# Patient Record
Sex: MALE | Race: WHITE | NOT HISPANIC OR LATINO | Employment: OTHER | URBAN - METROPOLITAN AREA
[De-identification: names, ages, dates, MRNs, and addresses within clinical notes are randomized per-mention and may not be internally consistent; named-entity substitution may affect disease eponyms.]

---

## 2017-05-08 ENCOUNTER — HOSPITAL ENCOUNTER (OUTPATIENT)
Dept: ULTRASOUND IMAGING | Facility: CLINIC | Age: 62
Discharge: HOME | End: 2017-05-08
Attending: INTERNAL MEDICINE | Admitting: INTERNAL MEDICINE
Payer: COMMERCIAL

## 2017-05-08 DIAGNOSIS — R10.11 RIGHT UPPER QUADRANT PAIN: ICD-10-CM

## 2017-05-08 PROCEDURE — 76700 US EXAM ABDOM COMPLETE: CPT

## 2024-01-18 ENCOUNTER — OFFICE VISIT (OUTPATIENT)
Dept: INTERNAL MEDICINE | Facility: CLINIC | Age: 69
End: 2024-01-18
Payer: COMMERCIAL

## 2024-01-18 VITALS
DIASTOLIC BLOOD PRESSURE: 84 MMHG | TEMPERATURE: 99 F | BODY MASS INDEX: 26.88 KG/M2 | WEIGHT: 187.8 LBS | RESPIRATION RATE: 18 BRPM | SYSTOLIC BLOOD PRESSURE: 128 MMHG | HEART RATE: 93 BPM | OXYGEN SATURATION: 96 % | HEIGHT: 70 IN

## 2024-01-18 DIAGNOSIS — H92.03 EAR DISCOMFORT, BILATERAL: Primary | ICD-10-CM

## 2024-01-18 DIAGNOSIS — M25.562 CHRONIC PAIN OF LEFT KNEE: ICD-10-CM

## 2024-01-18 DIAGNOSIS — G89.29 CHRONIC PAIN OF LEFT KNEE: ICD-10-CM

## 2024-01-18 PROCEDURE — 99203 OFFICE O/P NEW LOW 30 MIN: CPT

## 2024-01-18 RX ORDER — RESPIRATORY SYNCYTIAL VIRUS VACCINE 120MCG/0.5
0.5 KIT INTRAMUSCULAR ONCE
Qty: 1 EACH | Refills: 0 | Status: CANCELLED | OUTPATIENT
Start: 2024-01-18 | End: 2024-01-18

## 2024-01-18 RX ORDER — CIPROFLOXACIN AND DEXAMETHASONE 3; 1 MG/ML; MG/ML
4 SUSPENSION/ DROPS AURICULAR (OTIC) 2 TIMES DAILY
Qty: 7.5 ML | Refills: 0 | Status: SHIPPED | OUTPATIENT
Start: 2024-01-18

## 2024-01-18 NOTE — PROGRESS NOTES
"  Assessment & Plan     (H92.03) Ear discomfort, bilateral  (primary encounter diagnosis)  Comment:     Pt has history of itchy, burning ears bilaterally since 2020.  He was seen in march of 2022 for this same concern and was treated with Ciprodex x 7 days. He notes this resolved symptoms for 6 months and then symptoms returned and have been present for past 1.5 years He denies any pain, drainage from his ears, or recent cold infection.    Although there is no evidence of infection upon exam, I will treat with Ciprodex x 7 days and have sent ENT referral as well  Plan: ciprofloxacin-dexAMETHasone (CIPRODEX) 0.3-0.1         % otic suspension, Adult ENT  Referral            (M25.562,  G89.29) Chronic pain of left knee  Comment:   Plan: Orthopedic  Referral              BMI  Estimated body mass index is 26.95 kg/m  as calculated from the following:    Height as of this encounter: 1.778 m (5' 10\").    Weight as of this encounter: 85.2 kg (187 lb 12.8 oz).             Mikaela Viera is a 68 year old, presenting for the following health issues:  Ear Problem (He has ear infection.)      1/18/2024     9:45 AM   Additional Questions   Roomed by Janice Barrios MA   Accompanied by Himself     History of Present Illness       Reason for visit:  Ear infection    He eats 0-1 servings of fruits and vegetables daily.He consumes 1 sweetened beverage(s) daily.He exercises with enough effort to increase his heart rate 20 to 29 minutes per day.  He exercises with enough effort to increase his heart rate 4 days per week.   He is taking medications regularly.               Objective    /84 (BP Location: Left arm, Patient Position: Sitting, Cuff Size: Adult Regular)   Pulse 93   Temp 99  F (37.2  C) (Oral)   Resp 18   Ht 1.778 m (5' 10\")   Wt 85.2 kg (187 lb 12.8 oz)   SpO2 96%   BMI 26.95 kg/m    Body mass index is 26.95 kg/m .    Physical Exam  Constitutional:       General: He is not in acute " distress.     Appearance: Normal appearance. He is not ill-appearing, toxic-appearing or diaphoretic.   HENT:      Head: Normocephalic and atraumatic.      Right Ear: Tympanic membrane, ear canal and external ear normal. There is no impacted cerumen.      Left Ear: Tympanic membrane, ear canal and external ear normal. There is no impacted cerumen.   Skin:     General: Skin is warm and dry.   Neurological:      Mental Status: He is alert and oriented to person, place, and time.   Psychiatric:         Mood and Affect: Mood normal.         Behavior: Behavior normal.         Thought Content: Thought content normal.         Judgment: Judgment normal.                    Signed Electronically by: KALINA Worthington CNP

## 2024-01-19 NOTE — PROGRESS NOTES
ASSESSMENT & PLAN    Maximiliano was seen today for pain.    Diagnoses and all orders for this visit:    Patellofemoral arthritis of left knee  -     Orthopedic  Referral  -     XR Knee Standing AP [place] Bilat Lat Left; Future  -     Physical Therapy Referral; Future      This issue is chronic and Worsening.  Maximiliano presents to clinic today to discuss his chronic worsening left knee pain.  Radiographs taken in clinic today were reviewed with the patient and show moderate osteoarthritis of the patellofemoral compartment, with preservation of his medial and lateral compartments.  On examination, he localizes his pain over the patellar tendon he has tenderness to palpation on the bilateral patellar facets and over the patellar tendon.  He also has noted crepitus with flexion extension of the patella.  Overall, his history, exam findings, and imaging findings are all consistent with his patellofemoral arthritis being the main  of his symptoms.  We discussed these findings and the possible treatment options including NSAIDs, physical therapy, bracing, corticosteroid injections, and referral to the orthopedic surgery team.  We determined the following plan:  -Physical therapy referral placed today to work on quad and hip strengthening  -He can continue to use over-the-counter pain medications, ice, heat as needed  -He can trial an over-the-counter compression sleeve for use during activity  -He can follow-up in our clinic in 4 to 6 weeks if not improving or sooner if unable to tolerate PT.  At that time would consider a CSI to the left knee joint      Jared Acosta DO  [facility] [place]    -----  Chief Complaint   Patient presents with    Left Knee - Pain       SUBJECTIVE  Maximiliano Michael is a/an 68 year old male who is seen in consultation at the request of  Josette Galvez C.N.P. for evaluation of left knee pain.     The patient is seen by themselves.    Onset: 5  "years(s) ago. Reports insidious onset without acute precipitating event.  Location of Pain: left anterior inferior patella, posterior knee  Worsened by: stairs, jogging  Better with: nothing yet  Treatments tried: no treatment tried to date  Associated symptoms: no distal numbness or tingling; denies swelling or warmth, constant pain with every step - dull    Orthopedic/Surgical history: YES R knee \"pigs tendon\" - Date: 20 years  Social History/Occupation: retired      REVIEW OF SYSTEMS:  Review of systems negative unless mentioned in HPI     OBJECTIVE:  /83   Pulse 73   Ht 1.778 m (5' 10\")   Wt 83.9 kg (185 lb)   BMI 26.54 kg/m     General: healthy, alert and in no distress  Skin: no suspicious lesions or rash.  CV: distal perfusion intact   Resp: normal respiratory effort without conversational dyspnea   Psych: normal mood and affect  Gait: NORMAL  Neuro: Normal light sensory exam of LL extremity     Left Knee exam  Gait: Normal  Alignment:  [x] Normal  [] Anatomic valgus  [] Anatomic varus  Inspection: [x] Normal  [] Ecchymosis present []Other   Palpation:  Joint Tenderness: [] No Tenderness  [] MJL [] LJL [] MCL Margin [] LCL Margin [] Pes Anserine [x] Distal Quadricep  [] Other   Peripatellar Tenderness: [] None [x] Lateral pole [x] Medial pole [x] Superior pole [] Inferior pole    Patellar tendon pain: [] None [x] Present    Patellar apprehension: [x] None [] Present    Patellar motion: [] Normal [x] Abnormal  Crepitus: [] None [x] Present  Effusion: [x] None [] Trace [] 1+ [] 2+ [] 3+  Range of motion:  Flexion: 135, Extension: 0  Strength:  [x] Full in all planes, including intact extensor mechanism [] Limited as described  Neurologic: Normal sensation   Vascular: Normal pulses   Special tests:   Lachman: Negative  Anterior Drawer: Negative  Sag/quad Activation: NP  Posterior Drawer: Negative  Valgus Stress: Negative at 0/30  Varus Stress: Negative at 0/30  Eduarda's: Negative  Thessaly: NP "     Other notable findings/comments: None     RADIOLOGY:  Final results and radiologist's interpretation, available in the [facility] health record.  Images were reviewed with the patient in the office today.  My personal interpretation of the performed imaging: Normal joint spacing of the medial and lateral compartments of the knee.  There is moderate loss of joint space in the patellofemoral compartment with lateral tilt of the patella with osteophytosis.

## 2024-01-20 ENCOUNTER — HEALTH MAINTENANCE LETTER (OUTPATIENT)
Age: 69
End: 2024-01-20

## 2024-01-23 ENCOUNTER — OFFICE VISIT (OUTPATIENT)
Dept: ORTHOPEDICS | Facility: CLINIC | Age: 69
End: 2024-01-23
Payer: COMMERCIAL

## 2024-01-23 ENCOUNTER — ANCILLARY PROCEDURE (OUTPATIENT)
Dept: GENERAL RADIOLOGY | Facility: CLINIC | Age: 69
End: 2024-01-23
Attending: STUDENT IN AN ORGANIZED HEALTH CARE EDUCATION/TRAINING PROGRAM
Payer: COMMERCIAL

## 2024-01-23 VITALS
BODY MASS INDEX: 26.48 KG/M2 | DIASTOLIC BLOOD PRESSURE: 83 MMHG | HEIGHT: 70 IN | WEIGHT: 185 LBS | SYSTOLIC BLOOD PRESSURE: 136 MMHG | HEART RATE: 73 BPM

## 2024-01-23 DIAGNOSIS — G89.29 CHRONIC PAIN OF LEFT KNEE: ICD-10-CM

## 2024-01-23 DIAGNOSIS — M17.12 PATELLOFEMORAL ARTHRITIS OF LEFT KNEE: Primary | ICD-10-CM

## 2024-01-23 DIAGNOSIS — M25.562 CHRONIC PAIN OF LEFT KNEE: ICD-10-CM

## 2024-01-23 PROCEDURE — 73560 X-RAY EXAM OF KNEE 1 OR 2: CPT | Mod: TC | Performed by: RADIOLOGY

## 2024-01-23 PROCEDURE — 99203 OFFICE O/P NEW LOW 30 MIN: CPT | Performed by: STUDENT IN AN ORGANIZED HEALTH CARE EDUCATION/TRAINING PROGRAM

## 2024-01-23 PROCEDURE — 73562 X-RAY EXAM OF KNEE 3: CPT | Mod: TC | Performed by: RADIOLOGY

## 2024-01-23 NOTE — LETTER
1/23/2024         RE: Maximiliano Michael  [id number] [address]        Dear Colleague,    Thank you for referring your patient, Maximiliano Michael, to the [facility] [place]. Please see a copy of my visit note below.    ASSESSMENT & PLAN    Maximiliano was seen today for pain.    Diagnoses and all orders for this visit:    Patellofemoral arthritis of left knee  -     Orthopedic  Referral  -     XR Knee Standing AP [place] Bilat Lat Left; Future  -     Physical Therapy Referral; Future      This issue is chronic and Worsening.  Maximiliano presents to clinic today to discuss his chronic worsening left knee pain.  Radiographs taken in clinic today were reviewed with the patient and show moderate osteoarthritis of the patellofemoral compartment, with preservation of his medial and lateral compartments.  On examination, he localizes his pain over the patellar tendon he has tenderness to palpation on the bilateral patellar facets and over the patellar tendon.  He also has noted crepitus with flexion extension of the patella.  Overall, his history, exam findings, and imaging findings are all consistent with his patellofemoral arthritis being the main  of his symptoms.  We discussed these findings and the possible treatment options including NSAIDs, physical therapy, bracing, corticosteroid injections, and referral to the orthopedic surgery team.  We determined the following plan:  -Physical therapy referral placed today to work on quad and hip strengthening  -He can continue to use over-the-counter pain medications, ice, heat as needed  -He can trial an over-the-counter compression sleeve for use during activity  -He can follow-up in our clinic in 4 to 6 weeks if not improving or sooner if unable to tolerate PT.  At that time would consider a CSI to the left knee joint      Jared Acosta, DO  [facility]  "[place]    -----  Chief Complaint   Patient presents with     Left Knee - Pain       SUBJECTIVE  Maximiliano Michael is a/an 68 year old male who is seen in consultation at the request of  Josette Galvez C.N.P. for evaluation of left knee pain.     The patient is seen by themselves.    Onset: 5 years(s) ago. Reports insidious onset without acute precipitating event.  Location of Pain: left anterior inferior patella, posterior knee  Worsened by: stairs, jogging  Better with: nothing yet  Treatments tried: no treatment tried to date  Associated symptoms: no distal numbness or tingling; denies swelling or warmth, constant pain with every step - dull    Orthopedic/Surgical history: YES R knee \"pigs tendon\" - Date: 20 years  Social History/Occupation: retired      REVIEW OF SYSTEMS:  Review of systems negative unless mentioned in HPI     OBJECTIVE:  /83   Pulse 73   Ht 1.778 m (5' 10\")   Wt 83.9 kg (185 lb)   BMI 26.54 kg/m     General: healthy, alert and in no distress  Skin: no suspicious lesions or rash.  CV: distal perfusion intact   Resp: normal respiratory effort without conversational dyspnea   Psych: normal mood and affect  Gait: NORMAL  Neuro: Normal light sensory exam of LL extremity     Left Knee exam  Gait: Normal  Alignment:  [x] Normal  [] Anatomic valgus  [] Anatomic varus  Inspection: [x] Normal  [] Ecchymosis present []Other   Palpation:  Joint Tenderness: [] No Tenderness  [] MJL [] LJL [] MCL Margin [] LCL Margin [] Pes Anserine [x] Distal Quadricep  [] Other   Peripatellar Tenderness: [] None [x] Lateral pole [x] Medial pole [x] Superior pole [] Inferior pole    Patellar tendon pain: [] None [x] Present    Patellar apprehension: [x] None [] Present    Patellar motion: [] Normal [x] Abnormal  Crepitus: [] None [x] Present  Effusion: [x] None [] Trace [] 1+ [] 2+ [] 3+  Range of motion:  Flexion: 135, Extension: 0  Strength:  [x] Full in all planes, including intact extensor mechanism [] " Limited as described  Neurologic: Normal sensation   Vascular: Normal pulses   Special tests:   Lachman: Negative  Anterior Drawer: Negative  Sag/quad Activation: NP  Posterior Drawer: Negative  Valgus Stress: Negative at 0/30  Varus Stress: Negative at 0/30  Eduarda's: Negative  Thessaly: NP     Other notable findings/comments: None     RADIOLOGY:  Final results and radiologist's interpretation, available in the [facility] health record.  Images were reviewed with the patient in the office today.  My personal interpretation of the performed imaging: Normal joint spacing of the medial and lateral compartments of the knee.  There is moderate loss of joint space in the patellofemoral compartment with lateral tilt of the patella with osteophytosis.        Again, thank you for allowing me to participate in the care of your patient.        Sincerely,        Jared Acosta, DO

## 2024-10-30 ENCOUNTER — OFFICE VISIT (OUTPATIENT)
Dept: FAMILY MEDICINE | Facility: CLINIC | Age: 69
End: 2024-10-30

## 2024-10-30 VITALS
TEMPERATURE: 97.1 F | WEIGHT: 188.6 LBS | HEIGHT: 69 IN | HEART RATE: 62 BPM | BODY MASS INDEX: 27.93 KG/M2 | SYSTOLIC BLOOD PRESSURE: 126 MMHG | OXYGEN SATURATION: 96 % | DIASTOLIC BLOOD PRESSURE: 78 MMHG

## 2024-10-30 DIAGNOSIS — E78.2 MIXED HYPERLIPIDEMIA: ICD-10-CM

## 2024-10-30 DIAGNOSIS — Z01.818 PRE-OPERATIVE GENERAL PHYSICAL EXAMINATION: Primary | ICD-10-CM

## 2024-10-30 DIAGNOSIS — M16.11 PRIMARY OSTEOARTHRITIS OF RIGHT HIP: ICD-10-CM

## 2024-10-30 LAB
% GRANULOCYTES: 62 %
BUN SERPL-MCNC: 19 MG/DL (ref 7–25)
BUN/CREATININE RATIO: 17 (ref 6–32)
CALCIUM SERPL-MCNC: 9.3 MG/DL (ref 8.6–10.3)
CHLORIDE SERPLBLD-SCNC: 104 MMOL/L (ref 98–110)
CO2 SERPL-SCNC: 25.4 MMOL/L (ref 20–32)
CREAT SERPL-MCNC: 1.15 MG/DL (ref 0.6–1.3)
GLUCOSE SERPL-MCNC: 85 MG/DL (ref 60–99)
HCT VFR BLD AUTO: 45.3 % (ref 40–53)
HEMOGLOBIN: 14.3 G/DL (ref 13.3–17.7)
LYMPHOCYTES NFR BLD AUTO: 29.7 %
MCH RBC QN AUTO: 29.4 PG (ref 26–33)
MCHC RBC AUTO-ENTMCNC: 31.6 G/DL (ref 31–36)
MCV RBC AUTO: 93.1 FL (ref 78–100)
MONOCYTES NFR BLD AUTO: 8.3 %
PLATELET COUNT - QUEST: 201 10^9/L (ref 150–375)
POTASSIUM SERPL-SCNC: 4.6 MMOL/L (ref 3.5–5.3)
RBC # BLD AUTO: 4.87 10*12/L (ref 4.4–5.9)
SODIUM SERPL-SCNC: 138 MMOL/L (ref 135–146)
WBC # BLD AUTO: 6.9 10*9/L (ref 4–11)

## 2024-10-30 PROCEDURE — 36415 COLL VENOUS BLD VENIPUNCTURE: CPT | Performed by: STUDENT IN AN ORGANIZED HEALTH CARE EDUCATION/TRAINING PROGRAM

## 2024-10-30 PROCEDURE — 93000 ELECTROCARDIOGRAM COMPLETE: CPT | Performed by: STUDENT IN AN ORGANIZED HEALTH CARE EDUCATION/TRAINING PROGRAM

## 2024-10-30 PROCEDURE — 85025 COMPLETE CBC W/AUTO DIFF WBC: CPT | Performed by: STUDENT IN AN ORGANIZED HEALTH CARE EDUCATION/TRAINING PROGRAM

## 2024-10-30 PROCEDURE — 99204 OFFICE O/P NEW MOD 45 MIN: CPT | Performed by: STUDENT IN AN ORGANIZED HEALTH CARE EDUCATION/TRAINING PROGRAM

## 2024-10-30 PROCEDURE — 80048 BASIC METABOLIC PNL TOTAL CA: CPT | Performed by: STUDENT IN AN ORGANIZED HEALTH CARE EDUCATION/TRAINING PROGRAM

## 2024-10-30 NOTE — PROGRESS NOTES
Preoperative Evaluation  [facility]  [address] [place] [address]  [address]  [facility] [address]  Phone: [phone]  Fax: [phone]  Primary Provider: Physician No Ref-Primary  Pre-op Performing Provider: PAMELA ALLEN MD  Oct 30, 2024           10/30/2024   Surgical Information   What procedure is being done? R Hip Replacement   Facility or Hospital where procedure/surgery will be performed: [facility]   Who is doing the procedure / surgery? Jared Avila MD   Date of surgery / procedure: 11/13/24   Time of surgery / procedure: am   Where do you plan to recover after surgery? at home with family      Fax number for surgical facility: [phone]    Assessment & Plan     The proposed surgical procedure is considered INTERMEDIATE risk.    1. Pre-operative general physical examination (Primary)  2. Primary osteoarthritis of right hip  3. Mixed hyperlipidemia  - HEMOGRAM PLATELET DIFF (BFP)  - Basic Metabolic Panel (BFP)  - EKG 12-lead complete w/read - Clinics     New patient, Establish care, chart reviewed and updated.       - No identified additional risk factors other than previously addressed    Antiplatelet or Anticoagulation Medication Instructions   - Patient is on no antiplatelet or anticoagulation medications.    Additional Medication Instructions  Patient is on no additional chronic medications    Patient Instructions   Blood test and EKG today    Stop vitamin one week before surgery     Call with any concerns      Recommendation  Approval given to proceed with proposed procedure, without further diagnostic evaluation.    Pamela Allen MD, [place]    _________________________________________________    Mikaela Viera is a 69 year old, presenting for the following:  Pre-Op Exam (DOS 11[place]13/24 [place] [facility] [place]ila [place] and New Patient    HPI related to upcoming procedure: R hip OA, otherwise healthy      1. No - Have you ever had a heart attack or stroke?  2. No - Have you ever had surgery on your heart or blood vessels, such as a stent, coronary (heart) bypass, or surgery on an artery in the head, neck, heart, or legs?  3. No - Do you have chest pain when you are physically active?  4. No - Do you have a history of heart failure?  5. No - Do you currently have a cold, bronchitis, or symptoms of other respiratory (head and chest) infections?  6. No - Do you have a cough, shortness of breath, or wheezing?  7. No - Do you or anyone in your family have a history of blood clots?  8. No - Do you or anyone in your family have a serious bleeding problem, such as long-lasting bleeding after surgeries or cuts?  9. No - Have you ever had anemia or been told to take iron pills?  10. No - Have you had any abnormal blood loss such as black, tarry or bloody stools, or abnormal vaginal bleeding?  11. No - Have you ever had a blood transfusion?  12. Yes - Are you willing to have a blood transfusion if it is medically needed before, during, or after your surgery?  13. No - Have you or anyone in your family ever had problems with anesthesia (sedation for surgery)?  14. Yes - Do you have sleep apnea, excessive snoring, or daytime drowsiness? Suspected, declines eval/referral   15. No - Do you have any artifical heart valves or other implanted medical devices, such as a pacemaker, defibrillator, or continuous glucose monitor?  16. No - Do you have any artifical joints?  17. No - Are you allergic to latex?    Health Care Directive  Patient does not have a Health Care Directive: Discussed advance care planning with patient; however, patient declined at this time.      Status of Chronic Conditions:  See problem list for active medical problems.  Problems all longstanding and stable, except as noted/documented.  See ROS for pertinent symptoms related to these conditions.    Patient Active Problem List    Diagnosis Date Noted     "iamJOINT PAIN-LOWER LEG 03/29/2006     Priority: Medium    iamPOSTSURGICAL STATES NEC 03/29/2006     Priority: Medium      Past Medical History:   Diagnosis Date    HLD (hyperlipidemia)      Past Surgical History:   Procedure Laterality Date    ARTHROSCOPY KNEE Right     LACERATION REPAIR      R arm    NECK SURGERY      benign cyst     No current outpatient medications on file.       Allergies   Allergen Reactions    No Known Drug Allergy         Social History     Tobacco Use    Smoking status: Former     Types: Cigarettes    Smokeless tobacco: Never    Tobacco comments:     occasional cigarette   Substance Use Topics    Alcohol use: Yes     Family History   Problem Relation Age of Onset    Coronary Artery Disease Mother     Circulatory Mother         \"hardening of the arteries\"    Coronary Artery Disease Father     Cerebrovascular Disease Father     Heart Disease Father         MI    Skin Cancer Father     Coronary Artery Disease Brother     Alcoholism Brother     No Known Problems Brother     Anesthesia Reaction No family hx of     Deep Vein Thrombosis (DVT) No family hx of     Bleeding Disorder No family hx of      History   Drug Use Not on file             Review of Systems  12 point ROS performed and negative for new concerns except as mentioned above     Objective    /78 (BP Location: Left arm, Patient Position: Sitting, Cuff Size: Adult Large)   Pulse 62   Temp 97.1  F (36.2  C) (Temporal)   Ht 1.74 m (5' 8.5\")   Wt 85.5 kg (188 lb 9.6 oz)   SpO2 96%   BMI 28.26 kg/m     Estimated body mass index is 28.26 kg/m  as calculated from the following:    Height as of this encounter: 1.74 m (5' 8.5\").    Weight as of this encounter: 85.5 kg (188 lb 9.6 oz).  Physical Exam  GENERAL: alert and no distress  EYES: Eyes grossly normal to inspection, PERRL and conjunctivae and sclerae normal  NECK: no adenopathy, no asymmetry, masses, or scars  RESP: lungs clear to auscultation - no rales, rhonchi or " wheezes  CV: regular rate and rhythm, normal S1 S2, no S3 or S4, no murmur, click or rub, no peripheral edema  MS: no gross musculoskeletal defects noted, no edema  NEURO: Normal strength and tone, mentation intact and speech normal  PSYCH: mentation appears normal, affect normal/bright      Diagnostics  Results for orders placed or performed in visit on 10/30/24   HEMOGRAM PLATELET DIFF (BFP)     Status: None   Result Value Ref Range    WBC 6.9 4.0 - 11 10*9/L    RBC Count 4.87 4.4 - 5.9 10*12/L    Hemoglobin 14.3 13.3 - 17.7 g/dL    Hematocrit 45.3 40.0 - 53.0 %    MCV 93.1 78 - 100 fL    MCH 29.4 26 - 33 pg    MCHC 31.6 31 - 36 g/dL    Platelet Count 201 150 - 375 10^9/L    % Granulocytes 62.0 %    % Lymphocytes 29.7 %    % Monocytes 8.3 %   Basic Metabolic Panel (BFP)     Status: None   Result Value Ref Range    Carbon Dioxide 25.4 20 - 32 mmol/L    Creatinine 1.15 0.60 - 1.30 mg/dL    Glucose 85 60 - 99 mg/dL    Sodium 138.0 135 - 146 mmol/L    Potassium 4.6 3.5 - 5.3 mmol/L    Chloride 104.0 98 - 110 mmol/L    Urea Nitrogen 19 7 - 25 mg/dL    Calcium 9.3 8.6 - 10.3 mg/dL    BUN/Creatinine Ratio 17 6 - 32      EKG: NSR, normal intervals, no acute ST/T changes c/w ischemia, no prior EKGs available for comparison.    Revised Cardiac Risk Index (RCRI)  The patient has the following serious cardiovascular risks for perioperative complications:   - No serious cardiac risks = 0 points     RCRI Interpretation: 0 points: Class I (very low risk - 0.4% complication rate)         Signed Electronically by: PAMELA ALLEN MD  A copy of this evaluation report is provided to the requesting physician.

## 2024-10-30 NOTE — NURSING NOTE
Chief Complaint   Patient presents with    Pre-Op Exam     D[place] 11[place]13/24 [place] [facility] [place] Avila [place]    New Patient

## 2025-01-26 ENCOUNTER — HEALTH MAINTENANCE LETTER (OUTPATIENT)
Age: 70
End: 2025-01-26

## 2025-05-28 ENCOUNTER — PATIENT OUTREACH (OUTPATIENT)
Dept: INTERNAL MEDICINE | Facility: CLINIC | Age: 70
End: 2025-05-28
Payer: COMMERCIAL

## 2025-05-28 NOTE — TELEPHONE ENCOUNTER
Patient Quality Outreach    Patient is due for the following:   Depression  -  PHQ-A needed  Physical Annual Wellness Visit      Topic Date Due    Pneumococcal Vaccine (1 of 1 - PCV) Never done    Zoster (Shingles) Vaccine (1 of 2) Never done       Action(s) Taken:   Schedule a Annual Wellness Visit    Type of outreach:    Sent [employer] message.    Questions for provider review:    None         Hope L Rule, LPN  Chart routed to self.